# Patient Record
Sex: MALE | Race: WHITE | NOT HISPANIC OR LATINO | Employment: FULL TIME | URBAN - METROPOLITAN AREA
[De-identification: names, ages, dates, MRNs, and addresses within clinical notes are randomized per-mention and may not be internally consistent; named-entity substitution may affect disease eponyms.]

---

## 2022-10-20 ENCOUNTER — LAB VISIT (OUTPATIENT)
Dept: LAB | Facility: HOSPITAL | Age: 32
End: 2022-10-20
Attending: UROLOGY
Payer: COMMERCIAL

## 2022-10-20 ENCOUNTER — OFFICE VISIT (OUTPATIENT)
Dept: UROLOGY | Facility: CLINIC | Age: 32
End: 2022-10-20
Payer: COMMERCIAL

## 2022-10-20 VITALS
SYSTOLIC BLOOD PRESSURE: 127 MMHG | HEART RATE: 124 BPM | HEIGHT: 71 IN | BODY MASS INDEX: 26.46 KG/M2 | DIASTOLIC BLOOD PRESSURE: 86 MMHG | WEIGHT: 189 LBS

## 2022-10-20 DIAGNOSIS — E29.1 TESTICULAR FAILURE: Primary | ICD-10-CM

## 2022-10-20 DIAGNOSIS — E29.1 TESTICULAR FAILURE: ICD-10-CM

## 2022-10-20 LAB
FSH SERPL-ACNC: 25.73 MIU/ML (ref 0.95–11.95)
LH SERPL-ACNC: 6.4 MIU/ML (ref 0.6–12.1)
PROLACTIN SERPL IA-MCNC: 8.4 NG/ML (ref 3.5–19.4)
TESTOST SERPL-MCNC: 464 NG/DL (ref 304–1227)

## 2022-10-20 PROCEDURE — 36415 COLL VENOUS BLD VENIPUNCTURE: CPT | Performed by: UROLOGY

## 2022-10-20 PROCEDURE — 84403 ASSAY OF TOTAL TESTOSTERONE: CPT | Performed by: UROLOGY

## 2022-10-20 PROCEDURE — 1159F MED LIST DOCD IN RCRD: CPT | Mod: CPTII,S$GLB,, | Performed by: UROLOGY

## 2022-10-20 PROCEDURE — 81403 MOPATH PROCEDURE LEVEL 4: CPT | Performed by: UROLOGY

## 2022-10-20 PROCEDURE — 82670 ASSAY OF TOTAL ESTRADIOL: CPT | Performed by: UROLOGY

## 2022-10-20 PROCEDURE — 1160F RVW MEDS BY RX/DR IN RCRD: CPT | Mod: CPTII,S$GLB,, | Performed by: UROLOGY

## 2022-10-20 PROCEDURE — 83002 ASSAY OF GONADOTROPIN (LH): CPT | Performed by: UROLOGY

## 2022-10-20 PROCEDURE — 99204 OFFICE O/P NEW MOD 45 MIN: CPT | Mod: S$GLB,,, | Performed by: UROLOGY

## 2022-10-20 PROCEDURE — 88230 TISSUE CULTURE LYMPHOCYTE: CPT | Performed by: UROLOGY

## 2022-10-20 PROCEDURE — 83001 ASSAY OF GONADOTROPIN (FSH): CPT | Performed by: UROLOGY

## 2022-10-20 PROCEDURE — 1160F PR REVIEW ALL MEDS BY PRESCRIBER/CLIN PHARMACIST DOCUMENTED: ICD-10-PCS | Mod: CPTII,S$GLB,, | Performed by: UROLOGY

## 2022-10-20 PROCEDURE — 99999 PR PBB SHADOW E&M-NEW PATIENT-LVL III: CPT | Mod: PBBFAC,,, | Performed by: UROLOGY

## 2022-10-20 PROCEDURE — 3074F SYST BP LT 130 MM HG: CPT | Mod: CPTII,S$GLB,, | Performed by: UROLOGY

## 2022-10-20 PROCEDURE — 3079F DIAST BP 80-89 MM HG: CPT | Mod: CPTII,S$GLB,, | Performed by: UROLOGY

## 2022-10-20 PROCEDURE — 84146 ASSAY OF PROLACTIN: CPT | Performed by: UROLOGY

## 2022-10-20 PROCEDURE — 3074F PR MOST RECENT SYSTOLIC BLOOD PRESSURE < 130 MM HG: ICD-10-PCS | Mod: CPTII,S$GLB,, | Performed by: UROLOGY

## 2022-10-20 PROCEDURE — 99999 PR PBB SHADOW E&M-NEW PATIENT-LVL III: ICD-10-PCS | Mod: PBBFAC,,, | Performed by: UROLOGY

## 2022-10-20 PROCEDURE — 3079F PR MOST RECENT DIASTOLIC BLOOD PRESSURE 80-89 MM HG: ICD-10-PCS | Mod: CPTII,S$GLB,, | Performed by: UROLOGY

## 2022-10-20 PROCEDURE — 1159F PR MEDICATION LIST DOCUMENTED IN MEDICAL RECORD: ICD-10-PCS | Mod: CPTII,S$GLB,, | Performed by: UROLOGY

## 2022-10-20 PROCEDURE — 88262 CHROMOSOME ANALYSIS 15-20: CPT | Performed by: UROLOGY

## 2022-10-20 PROCEDURE — 99204 PR OFFICE/OUTPT VISIT, NEW, LEVL IV, 45-59 MIN: ICD-10-PCS | Mod: S$GLB,,, | Performed by: UROLOGY

## 2022-10-20 RX ORDER — CLONAZEPAM 0.5 MG/1
0.5 TABLET ORAL
COMMUNITY
Start: 2022-10-01

## 2022-10-20 RX ORDER — SERTRALINE HYDROCHLORIDE 50 MG/1
75 TABLET, FILM COATED ORAL DAILY
COMMUNITY
Start: 2022-09-26

## 2022-10-20 NOTE — PROGRESS NOTES
"Chief Complaint:  Infertility    HPI:    Mr. Kowalski is a 32 y.o.  male who has been  to his wife for the past 6 years. They have been trying to achieve a pregnancy for the past 1 year but without success. 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 He denies a history of erectile dysfunction and ejaculatory problems.    SA 10/6/22 4cc/azoospermia    He has achieved 0 pregnancies in the past.    Radha Cortes is 31 years old. ( 1990) Her menses are regular. She has not undergone prior hysterosalpingogram. She has achieved 0 prior pregnancies.  She sees Dr. Mccray    The couple has not undergone prior intrauterine insemination procedures.    The couple has not undergone prior in-vitro fertilization procedures.    Shen Kowalski denies a history of exposure to harmful chemicals, toxins, and radiation.    + history of recent fevers greater than 101.5 degrees Farenheit. Had Covid in     No history of recent exposure to "wet heat."    No history of urological trauma or testicular torsion.    No history of prostatitis, epididymitis, and orchitis.    No history of post-pubertal mumps.    There is no known family history of fertility problems.    REVIEW OF SYSTEMS:     He denies headache, blurred vision, fever, nausea, vomiting, chills, abdominal pain, chest pain, sore throat, bleeding per rectum, cough, SOB, recent loss of consciousness, recent mental status changes, seizures, dizziness, or upper or lower extremity weakness.    PHYSICAL EXAM:     The patient generally appears in good health, is appropriately interactive, and is in no apparent distress.     Eyes: anicteric sclerae, moist conjunctivae; no lid-lag; PERRLA     HENT: Atraumatic; oropharynx clear with moist mucous membranes and no mucosal ulcerations;normal hard and soft palate.  No evidence of lymphadenopathy.    Neck: Trachea midline.  No thyromegaly.    Musculoskeletal: No abnormal gait.    Skin: No " "lesions.    Mental: Cooperative with normal affect.  Is oriented to time, place, and person.    Neuro: Grossly intact.    Chest: Normal inspiratory effort.   No accessory muscles.  No audible wheezes.  Respirations symmetric on inspiration and expiration.    Heart: Regular rhythm.      Abdomen:  Soft, non-tender. No masses or organomegaly. Bladder is not palpable. No evidence of flank discomfort. No evidence of inguinal hernia.    Genitourinary: Penis is normal with no evidence of plaques or induration. Urethral meatus is normal. Scrotum is normal. Testes are descended bilaterally with no evidence of abnormal masses or tenderness. Epididymis, vas deferens, and cord structures are normal bilaterally. Testicular volume is approximately 18 cc bilaterally.      Extremities: No cyanosis, clubbing, or edema.    IMPRESSION & PLAN:    Mr. Kowalski is a 32 y.o.  male who has been  to his wife for the past 6 years. They have been trying to achieve a pregnancy for the past 1 year but without success. 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 He denies a history of erectile dysfunction and ejaculatory problems.     10/6/22 4cc/azoospermia    1.  FSH, LH, testosterone, prolactin, and estradiol serum levels today.  Karyotype and Y chromosome microdeletion.  2.  Semen analysis x 1.  Independently interpreted his outside SA's today.   3.  Return to the clinic in 3 weeks to discuss test results and treatment plan.  4.  Recommend avoiding "wet heat."  5.  Recommend taking a multivitamin and 500 mg of vitamin c daily in addition to the multivitamin.  6.  Please send a copy of the note to Dr. Mccray.  Thank you for the consultation.    CC: Mahendra      "

## 2022-10-21 LAB — ESTRADIOL SERPL-MCNC: 17 PG/ML (ref 11–44)

## 2022-10-27 LAB
CHROM BANDING METHOD: NORMAL
CHROMOSOME ANALYSIS CONGENITAL ADDITIONAL INFORMATION: NORMAL
CHROMOSOME ANALYSIS CONGENITAL RELEASED BY: NORMAL
CHROMOSOME ANALYSIS CONGENITAL RESULT SUMMARY: NORMAL
CLINICAL CYTOGENETICIST REVIEW: NORMAL
GENETICIST REVIEW: NORMAL
KARYOTYP SPEC: NORMAL
REASON FOR REFERRAL, CHROMOSOME ANALYSIS CONGENITAL: NORMAL
REF LAB TEST METHOD: NORMAL
SPECIMEN SOURCE: NORMAL
SPECIMEN SOURCE: NORMAL
SPECIMEN, CHROMOSOME ANALYSIS CONGENITAL: NORMAL
Y CHROM DEL BLD/T: NORMAL
Y MICRODELETION RELEASED BY: NORMAL
Y MICRODELETION RESULT SUMMARY: POSITIVE
Y MICRODELETION SPECIMEN: NORMAL

## 2022-11-28 ENCOUNTER — LAB VISIT (OUTPATIENT)
Dept: LAB | Facility: HOSPITAL | Age: 32
End: 2022-11-28
Attending: UROLOGY
Payer: COMMERCIAL

## 2022-11-28 ENCOUNTER — OFFICE VISIT (OUTPATIENT)
Dept: UROLOGY | Facility: CLINIC | Age: 32
End: 2022-11-28
Payer: COMMERCIAL

## 2022-11-28 VITALS
HEIGHT: 71 IN | DIASTOLIC BLOOD PRESSURE: 85 MMHG | HEART RATE: 97 BPM | SYSTOLIC BLOOD PRESSURE: 125 MMHG | BODY MASS INDEX: 26.45 KG/M2 | WEIGHT: 188.94 LBS

## 2022-11-28 DIAGNOSIS — E29.1 TESTICULAR FAILURE: Primary | ICD-10-CM

## 2022-11-28 DIAGNOSIS — Z11.3 SCREENING EXAMINATION FOR STD (SEXUALLY TRANSMITTED DISEASE): ICD-10-CM

## 2022-11-28 LAB
HBV CORE IGM SERPL QL IA: NORMAL
HBV SURFACE AG SERPL QL IA: NORMAL
HCV AB SERPL QL IA: NORMAL
HIV 1+2 AB+HIV1 P24 AG SERPL QL IA: NORMAL

## 2022-11-28 PROCEDURE — 1159F MED LIST DOCD IN RCRD: CPT | Mod: CPTII,S$GLB,, | Performed by: UROLOGY

## 2022-11-28 PROCEDURE — 1160F RVW MEDS BY RX/DR IN RCRD: CPT | Mod: CPTII,S$GLB,, | Performed by: UROLOGY

## 2022-11-28 PROCEDURE — 87340 HEPATITIS B SURFACE AG IA: CPT | Performed by: UROLOGY

## 2022-11-28 PROCEDURE — 99999 PR PBB SHADOW E&M-EST. PATIENT-LVL III: ICD-10-PCS | Mod: PBBFAC,,, | Performed by: UROLOGY

## 2022-11-28 PROCEDURE — 99215 OFFICE O/P EST HI 40 MIN: CPT | Mod: S$GLB,,, | Performed by: UROLOGY

## 2022-11-28 PROCEDURE — 3074F SYST BP LT 130 MM HG: CPT | Mod: CPTII,S$GLB,, | Performed by: UROLOGY

## 2022-11-28 PROCEDURE — 86645 CMV ANTIBODY IGM: CPT | Performed by: UROLOGY

## 2022-11-28 PROCEDURE — 3074F PR MOST RECENT SYSTOLIC BLOOD PRESSURE < 130 MM HG: ICD-10-PCS | Mod: CPTII,S$GLB,, | Performed by: UROLOGY

## 2022-11-28 PROCEDURE — 86705 HEP B CORE ANTIBODY IGM: CPT | Performed by: UROLOGY

## 2022-11-28 PROCEDURE — 3079F DIAST BP 80-89 MM HG: CPT | Mod: CPTII,S$GLB,, | Performed by: UROLOGY

## 2022-11-28 PROCEDURE — 1159F PR MEDICATION LIST DOCUMENTED IN MEDICAL RECORD: ICD-10-PCS | Mod: CPTII,S$GLB,, | Performed by: UROLOGY

## 2022-11-28 PROCEDURE — 87491 CHLMYD TRACH DNA AMP PROBE: CPT | Performed by: UROLOGY

## 2022-11-28 PROCEDURE — 3079F PR MOST RECENT DIASTOLIC BLOOD PRESSURE 80-89 MM HG: ICD-10-PCS | Mod: CPTII,S$GLB,, | Performed by: UROLOGY

## 2022-11-28 PROCEDURE — 36415 COLL VENOUS BLD VENIPUNCTURE: CPT | Performed by: UROLOGY

## 2022-11-28 PROCEDURE — 87389 HIV-1 AG W/HIV-1&-2 AB AG IA: CPT | Performed by: UROLOGY

## 2022-11-28 PROCEDURE — 86803 HEPATITIS C AB TEST: CPT | Performed by: UROLOGY

## 2022-11-28 PROCEDURE — 99215 PR OFFICE/OUTPT VISIT, EST, LEVL V, 40-54 MIN: ICD-10-PCS | Mod: S$GLB,,, | Performed by: UROLOGY

## 2022-11-28 PROCEDURE — 99999 PR PBB SHADOW E&M-EST. PATIENT-LVL III: CPT | Mod: PBBFAC,,, | Performed by: UROLOGY

## 2022-11-28 PROCEDURE — 87591 N.GONORRHOEAE DNA AMP PROB: CPT | Performed by: UROLOGY

## 2022-11-28 PROCEDURE — 86592 SYPHILIS TEST NON-TREP QUAL: CPT | Performed by: UROLOGY

## 2022-11-28 PROCEDURE — 3008F PR BODY MASS INDEX (BMI) DOCUMENTED: ICD-10-PCS | Mod: CPTII,S$GLB,, | Performed by: UROLOGY

## 2022-11-28 PROCEDURE — 1160F PR REVIEW ALL MEDS BY PRESCRIBER/CLIN PHARMACIST DOCUMENTED: ICD-10-PCS | Mod: CPTII,S$GLB,, | Performed by: UROLOGY

## 2022-11-28 PROCEDURE — 3008F BODY MASS INDEX DOCD: CPT | Mod: CPTII,S$GLB,, | Performed by: UROLOGY

## 2022-11-28 PROCEDURE — 86644 CMV ANTIBODY: CPT | Performed by: UROLOGY

## 2022-11-28 NOTE — LETTER
November 28, 2022        Vida Mccray MD  [id number]   [facility]  [facility] [id number]             [facility] 4th Fl  [address]  [place] [address]  Phone: [phone]   Patient: Shen Kowalski   MR Number: [id number]   YOB: 1990   Date of Visit: 11/28/2022       Dear Dr. Mccray:    Thank you for referring Shen Kowalski to me for evaluation. Attached you will find relevant portions of my assessment and plan of care.    If you have questions, please do not hesitate to call me. I look forward to following Shen Kowalski along with you.    Sincerely,      Judd Shelton MD            CC  No Recipients    Enclosure

## 2022-11-28 NOTE — PROGRESS NOTES
"Chief Complaint:  Infertility    HPI:    Mr. Kowalski is a 32 y.o.  male who has been  to his wife for the past 6 years. They have been trying to achieve a pregnancy for the past 1 year but without success. Shen Kowalski has undergone a semen analysis x 2 showing  azoospermia. He denies a history of erectile dysfunction and ejaculatory problems.    Karyotype is normal.  He has an AZFc deletion.    Lab Results   Component Value Date    TOTALTESTOST 464 10/20/2022    LABLH 6.4 10/20/2022    FSH 25.73 (H) 10/20/2022    ESTRADIOL 17 10/20/2022    PROLACTIN 8.4 10/20/2022      SA 10/6/22--4 cc/azoospermia  SA 22--5.0 cc/azoospermia    FOR REVIEW FROM PREVIOUS:    Mr. Kowalski is a 32 y.o.  male who has been  to his wife for the past 6 years. They have been trying to achieve a pregnancy for the past 1 year but without success. 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 He denies a history of erectile dysfunction and ejaculatory problems.    SA 10/6/22 4cc/azoospermia    He has achieved 0 pregnancies in the past.    Radha Cortes is 31 years old. ( 1990) Her menses are regular. She has not undergone prior hysterosalpingogram. She has achieved 0 prior pregnancies.  She sees Dr. Mccray    The couple has not undergone prior intrauterine insemination procedures.    The couple has not undergone prior in-vitro fertilization procedures.    Shen Kowalski denies a history of exposure to harmful chemicals, toxins, and radiation.    + history of recent fevers greater than 101.5 degrees Farenheit. Had Covid in     No history of recent exposure to "wet heat."    No history of urological trauma or testicular torsion.    No history of prostatitis, epididymitis, and orchitis.    No history of post-pubertal mumps.    There is no known family history of fertility problems.    REVIEW OF SYSTEMS:     He denies headache, blurred vision, fever, nausea, vomiting, chills, abdominal pain, chest " pain, sore throat, bleeding per rectum, cough, SOB, recent loss of consciousness, recent mental status changes, seizures, dizziness, or upper or lower extremity weakness.    PHYSICAL EXAM:     The patient generally appears in good health, is appropriately interactive, and is in no apparent distress.     Eyes: anicteric sclerae, moist conjunctivae; no lid-lag; PERRLA     HENT: Atraumatic; oropharynx clear with moist mucous membranes and no mucosal ulcerations;normal hard and soft palate.  No evidence of lymphadenopathy.    Neck: Trachea midline.  No thyromegaly.    Musculoskeletal: No abnormal gait.    Skin: No lesions.    Mental: Cooperative with normal affect.  Is oriented to time, place, and person.    Neuro: Grossly intact.    Chest: Normal inspiratory effort.   No accessory muscles.  No audible wheezes.  Respirations symmetric on inspiration and expiration.    Heart: Regular rhythm.      Abdomen:  Soft, non-tender. No masses or organomegaly. Bladder is not palpable. No evidence of flank discomfort. No evidence of inguinal hernia.    Genitourinary: Penis is normal with no evidence of plaques or induration. Urethral meatus is normal. Scrotum is normal. Testes are descended bilaterally with no evidence of abnormal masses or tenderness. Epididymis, vas deferens, and cord structures are normal bilaterally. Testicular volume is approximately 18 cc bilaterally.      Extremities: No cyanosis, clubbing, or edema.    IMPRESSION & PLAN:    Mr. Kowalski is a 32 y.o.  male who has been  to his wife for the past 6 years. They have been trying to achieve a pregnancy for the past 1 year but without success. Shen Kowalski has undergone a semen analysis x 2 showing  azoospermia. He denies a history of erectile dysfunction and ejaculatory problems.    Karyotype is normal.  He has an AZFc deletion.    Lab Results   Component Value Date    TOTALTESTOST 464 10/20/2022    LABLH 6.4 10/20/2022    FSH 25.73 (H) 10/20/2022     "ESTRADIOL 17 10/20/2022    PROLACTIN 8.4 10/20/2022      SA 10/6/22--4 cc/azoospermia  SA 11/23/22--5.0 cc/azoospermia    1.  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  2.  Discussed that he has an AZFc deletion.  Discussed TESE is possible.   3. Risks and benefits of epididymal sperm aspiration/TESE  were discussed in detail today including failure to retrieve sperm, infection, bleeding, hypogonadism, pain, loss of testicle, need for IVF with subsequent failure to achieve a pregnancy.     4.  Recommend avoiding "wet heat."  5.  Recommend taking a multivitamin and 500 mg of vitamin c daily in addition to the multivitamin.  6.  Please send a copy of the note to Dr. Mccray.      CC: Mahendra        "

## 2022-11-29 LAB
C TRACH DNA SPEC QL NAA+PROBE: NOT DETECTED
CMV IGG SERPL QL IA: NORMAL
N GONORRHOEA DNA SPEC QL NAA+PROBE: NOT DETECTED
RPR SER QL: NORMAL

## 2022-11-30 LAB — CMV IGM SERPL IA-ACNC: <8 AU/ML

## 2023-11-16 ENCOUNTER — PATIENT MESSAGE (OUTPATIENT)
Dept: UROLOGY | Facility: CLINIC | Age: 33
End: 2023-11-16
Payer: COMMERCIAL